# Patient Record
Sex: FEMALE | Race: WHITE | NOT HISPANIC OR LATINO | Employment: UNEMPLOYED | URBAN - METROPOLITAN AREA
[De-identification: names, ages, dates, MRNs, and addresses within clinical notes are randomized per-mention and may not be internally consistent; named-entity substitution may affect disease eponyms.]

---

## 2018-02-05 DIAGNOSIS — M54.5 LOW BACK PAIN, UNSPECIFIED BACK PAIN LATERALITY, UNSPECIFIED CHRONICITY, WITH SCIATICA PRESENCE UNSPECIFIED: Primary | ICD-10-CM

## 2018-03-02 ENCOUNTER — TELEPHONE (OUTPATIENT)
Dept: NEUROSURGERY | Facility: CLINIC | Age: 58
End: 2018-03-02

## 2018-03-05 ENCOUNTER — OFFICE VISIT (OUTPATIENT)
Dept: NEUROSURGERY | Facility: CLINIC | Age: 58
End: 2018-03-05
Payer: COMMERCIAL

## 2018-03-05 VITALS
TEMPERATURE: 99.1 F | DIASTOLIC BLOOD PRESSURE: 73 MMHG | WEIGHT: 162 LBS | HEART RATE: 88 BPM | RESPIRATION RATE: 16 BRPM | SYSTOLIC BLOOD PRESSURE: 107 MMHG | BODY MASS INDEX: 24.55 KG/M2 | HEIGHT: 68 IN

## 2018-03-05 DIAGNOSIS — G89.4 CHRONIC PAIN SYNDROME: Primary | ICD-10-CM

## 2018-03-05 DIAGNOSIS — M54.5 LOW BACK PAIN, UNSPECIFIED BACK PAIN LATERALITY, UNSPECIFIED CHRONICITY, WITH SCIATICA PRESENCE UNSPECIFIED: ICD-10-CM

## 2018-03-05 DIAGNOSIS — R10.32 GROIN PAIN, LEFT: ICD-10-CM

## 2018-03-05 DIAGNOSIS — M96.1 POST LAMINECTOMY SYNDROME: ICD-10-CM

## 2018-03-05 PROCEDURE — 99243 OFF/OP CNSLTJ NEW/EST LOW 30: CPT | Performed by: NEUROLOGICAL SURGERY

## 2018-03-05 RX ORDER — KRILL/OM-3/DHA/EPA/PHOSPHO/AST 500MG-86MG
1 CAPSULE ORAL
COMMUNITY

## 2018-03-05 RX ORDER — OMEPRAZOLE 40 MG/1
40 CAPSULE, DELAYED RELEASE ORAL
COMMUNITY

## 2018-03-05 RX ORDER — PREGABALIN 50 MG/1
CAPSULE ORAL
COMMUNITY
Start: 2018-02-13

## 2018-03-05 RX ORDER — SIMVASTATIN 10 MG
10 TABLET ORAL
COMMUNITY
Start: 2017-11-08

## 2018-03-05 RX ORDER — TRAZODONE HYDROCHLORIDE 100 MG/1
100 TABLET ORAL
COMMUNITY
Start: 2017-08-01

## 2018-03-05 RX ORDER — CELECOXIB 200 MG/1
200 CAPSULE ORAL
COMMUNITY
Start: 2017-09-28

## 2018-03-05 RX ORDER — OXYCODONE AND ACETAMINOPHEN 10; 325 MG/1; MG/1
1 TABLET ORAL
COMMUNITY
Start: 2018-02-12

## 2018-03-05 RX ORDER — LIDOCAINE 50 MG/G
2 PATCH TOPICAL EVERY 12 HOURS
COMMUNITY
Start: 2017-04-11

## 2018-03-05 RX ORDER — FENTANYL 75 UG/H
1 PATCH TRANSDERMAL
COMMUNITY
Start: 2018-02-12

## 2018-03-05 NOTE — PROGRESS NOTES
Patient ID: Lavern Mota is a 65 y o  female    Assessment/Plan:    Diagnoses and all orders for this visit:    Chronic pain syndrome    Low back pain, unspecified back pain laterality, unspecified chronicity, with sciatica presence unspecified  -     Ambulatory referral to Neurosurgery    Post laminectomy syndrome    Groin pain, left    Other orders  -     celecoxib (CeleBREX) 200 mg capsule; Take 200 mg by mouth  -     Cholecalciferol 2000 units CAPS; Take 1 capsule by mouth daily  -     fentaNYL (DURAGESIC) 75 mcg/hr; Place 1 patch on the skin every other day  -     Krill Oil 500 MG CAPS; Take 1 capsule by mouth daily at bedtime  -     lidocaine (LIDODERM) 5 %; Place 2 patches on the skin every 12 (twelve) hours  -     Multiple Vitamin (MULTIVITAMINS PO); Take 1 tablet by mouth daily  -     NON FORMULARY; Potassium 99 mg otc 2 pills at bed time  -     omeprazole (PriLOSEC) 40 MG capsule; Take 40 mg by mouth  -     oxyCODONE-acetaminophen (PERCOCET)  mg per tablet; Take 1 tablet by mouth  -     pregabalin (LYRICA) 50 mg capsule; TAKE ONE CAPSULE BY MOUTH ONE TIME DAILY   -     simvastatin (ZOCOR) 10 mg tablet; Take 10 mg by mouth  -     traZODone (DESYREL) 100 mg tablet; Take 100 mg by mouth          Discussion Summary: This is a 52-year-old female with a greater than 20 year history of chronic pain involving her lower back and left groin  History of 2 lumbar surgeries  She underwent placement of Nuvectra trial spinal cord stimulator electrodes by Dr Fozia Galvin  However, she reports the pain from the needle placement was so severe in the recovery room that she had the electrodes removed  She also reports she did not like the feeling of the stimulation for the brief time they turned it on  She is presenting to discuss options such as open trial placement    The open trial placement was reviewed with the patient and her  including the laminectomy and lead placement with extensions for a 2 part procedure  She was most concerned with the pain she would have from the incisional placement of this electrode  This is of concern given that she would need to deferentiate this surgical pain from her baseline pain during an open trial placement  Coverage of the left groin has also been difficult with spinal cord stimulation  Lastly, she does not like the feeling of the paresthesias  Given these factors both myself and the patient are hesitant to proceed with an open trial placement  She has a prior L2-3 fusion with degenerative changes at L1-2  These would preclude her from DRG placement for potential groin pain  In addition, this would not address her chronic low back pain  She will continue to follow up with Dr Fozia Galvin  Follow-up as needed with me  Reason for Visit: Consult      Chief Complaint: New patient consult to discuss open trial Lizzette Post)    HPI:  This is a pleasant 52-year-old female with chronic pain involving her lower back and left groin  She has a pain radiating across her lower back for which she is unable to characterize  She says it has been present for over 20 years  She also has a sharp pain in her left groin that makes it difficult to sleep  She has a history of lumbar surgery in 1989, as well as a subsequent one in 2001  Both performed at [facility]   She underwent placement of Nuvectra trial spinal cord stimulator electrodes by Dr Fozia Galvin  However, she reports the pain from the needle placement was so severe in the recovery room that she had the electrodes removed  She also reports she did not like the feeling of the stimulation for the brief time they turned it on  She was referred to discuss options  She takes Percocet daily  She is also on a fentanyl patch  Review of Systems   Constitutional: Positive for activity change  HENT: Negative  Eyes: Negative  Respiratory: Negative  Cardiovascular: Negative  Gastrointestinal: Negative  Endocrine: Negative  Genitourinary: Negative  Musculoskeletal: Positive for back pain (into the left groin and left leg)  She reports a herniated disc on her right side   Skin: Negative  Allergic/Immunologic: Negative  Neurological: Positive for weakness (both legs)  Hematological: Negative  Psychiatric/Behavioral: Negative          The following portions of the patient's history were reviewed and updated as appropriate: allergies, current medications, past family history, past medical history, past social history, past surgical history and problem list       Active Ambulatory Problems     Diagnosis Date Noted    No Active Ambulatory Problems     Resolved Ambulatory Problems     Diagnosis Date Noted    No Resolved Ambulatory Problems     Past Medical History:   Diagnosis Date    Hypertension     Low back pain        Past Surgical History:   Procedure Laterality Date    APPENDECTOMY      CHOLECYSTECTOMY      LUMBAR FUSION      L1-L5       History   Smoking Status    Never Smoker   Smokeless Tobacco    Never Used       History   Alcohol Use No       History   Drug Use No       Vitals:    03/05/18 1552   BP: 107/73   Pulse: 88   Resp: 16   Temp: 99 1 °F (37 3 °C)         Current Outpatient Prescriptions:     celecoxib (CeleBREX) 200 mg capsule, Take 200 mg by mouth, Disp: , Rfl:     Cholecalciferol 2000 units CAPS, Take 1 capsule by mouth daily, Disp: , Rfl:     fentaNYL (DURAGESIC) 75 mcg/hr, Place 1 patch on the skin every other day, Disp: , Rfl:     Krill Oil 500 MG CAPS, Take 1 capsule by mouth daily at bedtime, Disp: , Rfl:     lidocaine (LIDODERM) 5 %, Place 2 patches on the skin every 12 (twelve) hours, Disp: , Rfl:     Multiple Vitamin (MULTIVITAMINS PO), Take 1 tablet by mouth daily, Disp: , Rfl:     NON FORMULARY, Potassium 99 mg otc 2 pills at bed time, Disp: , Rfl:     omeprazole (PriLOSEC) 40 MG capsule, Take 40 mg by mouth, Disp: , Rfl:    oxyCODONE-acetaminophen (PERCOCET)  mg per tablet, Take 1 tablet by mouth, Disp: , Rfl:     pregabalin (LYRICA) 50 mg capsule, TAKE ONE CAPSULE BY MOUTH ONE TIME DAILY , Disp: , Rfl:     simvastatin (ZOCOR) 10 mg tablet, Take 10 mg by mouth, Disp: , Rfl:     traZODone (DESYREL) 100 mg tablet, Take 100 mg by mouth, Disp: , Rfl:       Physical Exam   Constitutional: She is oriented to person, place, and time  She appears well-developed  HENT:   Head: Normocephalic  Eyes: Pupils are equal, round, and reactive to light  Neck: Normal range of motion  Pulmonary/Chest: Effort normal    Musculoskeletal: Normal range of motion  Neurological: She is alert and oriented to person, place, and time  She has normal strength and normal reflexes  No sensory deficit  Psychiatric: She has a normal mood and affect  Her behavior is normal          Results/Data:  Reviewed imaging and report of MRI lumbar spine October 2016  There are postoperative changes from an L2-3 decompression and fusion  There is mild to moderate adjacent level disease at L1-2

## 2018-03-09 ENCOUNTER — DOCUMENTATION (OUTPATIENT)
Dept: NEUROSURGERY | Facility: CLINIC | Age: 58
End: 2018-03-09